# Patient Record
Sex: FEMALE | Race: WHITE | NOT HISPANIC OR LATINO | URBAN - METROPOLITAN AREA
[De-identification: names, ages, dates, MRNs, and addresses within clinical notes are randomized per-mention and may not be internally consistent; named-entity substitution may affect disease eponyms.]

---

## 2017-04-18 ENCOUNTER — IMPORTED ENCOUNTER (OUTPATIENT)
Dept: URBAN - METROPOLITAN AREA CLINIC 50 | Facility: CLINIC | Age: 82
End: 2017-04-18

## 2017-04-26 ENCOUNTER — IMPORTED ENCOUNTER (OUTPATIENT)
Dept: URBAN - METROPOLITAN AREA CLINIC 50 | Facility: CLINIC | Age: 82
End: 2017-04-26

## 2017-10-06 ENCOUNTER — IMPORTED ENCOUNTER (OUTPATIENT)
Dept: URBAN - METROPOLITAN AREA CLINIC 50 | Facility: CLINIC | Age: 82
End: 2017-10-06

## 2017-10-06 NOTE — PATIENT DISCUSSION
"""Follow OS ERM w/o surgery. Call if vision decreases or distortion increases. Recommend regular Amsler checks.  """

## 2017-11-03 ENCOUNTER — IMPORTED ENCOUNTER (OUTPATIENT)
Dept: URBAN - METROPOLITAN AREA CLINIC 50 | Facility: CLINIC | Age: 82
End: 2017-11-03

## 2018-01-10 ENCOUNTER — IMPORTED ENCOUNTER (OUTPATIENT)
Dept: URBAN - METROPOLITAN AREA CLINIC 50 | Facility: CLINIC | Age: 83
End: 2018-01-10

## 2018-01-22 ENCOUNTER — IMPORTED ENCOUNTER (OUTPATIENT)
Dept: URBAN - METROPOLITAN AREA CLINIC 50 | Facility: CLINIC | Age: 83
End: 2018-01-22

## 2018-09-26 ENCOUNTER — IMPORTED ENCOUNTER (OUTPATIENT)
Dept: URBAN - METROPOLITAN AREA CLINIC 50 | Facility: CLINIC | Age: 83
End: 2018-09-26

## 2018-10-18 ENCOUNTER — IMPORTED ENCOUNTER (OUTPATIENT)
Dept: URBAN - METROPOLITAN AREA CLINIC 50 | Facility: CLINIC | Age: 83
End: 2018-10-18

## 2018-10-23 ENCOUNTER — IMPORTED ENCOUNTER (OUTPATIENT)
Dept: URBAN - METROPOLITAN AREA CLINIC 50 | Facility: CLINIC | Age: 83
End: 2018-10-23

## 2019-03-21 NOTE — PATIENT DISCUSSION
Patient given Rx for glasses.  due to work at [employer] needs CPU/NEAR RX ed will have to take on and off in the store.

## 2019-04-23 ENCOUNTER — IMPORTED ENCOUNTER (OUTPATIENT)
Dept: URBAN - METROPOLITAN AREA CLINIC 50 | Facility: CLINIC | Age: 84
End: 2019-04-23

## 2019-10-23 ENCOUNTER — IMPORTED ENCOUNTER (OUTPATIENT)
Dept: URBAN - METROPOLITAN AREA CLINIC 50 | Facility: CLINIC | Age: 84
End: 2019-10-23

## 2019-10-23 NOTE — PATIENT DISCUSSION
"""Continue Artificial tears both eyes two - four times a day ."" ""Continue Gel drops both eyes at bedtime ."""

## 2019-10-25 ENCOUNTER — IMPORTED ENCOUNTER (OUTPATIENT)
Dept: URBAN - METROPOLITAN AREA CLINIC 50 | Facility: CLINIC | Age: 84
End: 2019-10-25

## 2019-10-25 NOTE — PATIENT DISCUSSION
"""Patient elects to change glasses.  Patient instructed to call office immediately if sudden changes ""

## 2020-04-22 ENCOUNTER — IMPORTED ENCOUNTER (OUTPATIENT)
Dept: URBAN - METROPOLITAN AREA CLINIC 50 | Facility: CLINIC | Age: 85
End: 2020-04-22

## 2020-07-21 NOTE — PATIENT DISCUSSION
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 baseline RNFL OCT today.  follow with serial analysis.  recommend get baseline HVF in near future.

## 2020-09-23 NOTE — PATIENT DISCUSSION
9/23/20:.  updated HVF today is full today but watch due to [facility] OD.  ed importance of yearly monitoring to prevent VA loss/blindness.  CCTs are average so true IOP likely as taken.

## 2021-04-08 ENCOUNTER — IMPORTED ENCOUNTER (OUTPATIENT)
Dept: URBAN - METROPOLITAN AREA CLINIC 50 | Facility: CLINIC | Age: 86
End: 2021-04-08

## 2021-04-18 ASSESSMENT — VISUAL ACUITY
OD_OTHER: 20/50. 20/70.
OD_CC: 20/20-
OS_CC: 20/25
OS_CC: 20/40-2
OS_CC: 20/25+2
OD_BAT: 20/100
OD_CC: 20/20
OS_CC: 20/30+2
OD_OTHER: 20/100.
OS_CC: J1+
OD_CC: 20/25+2
OS_CC: 20/25
OS_CC: J1@ 14 IN
OS_OTHER: 20/200.
OS_CC: 20/50
OD_CC: J1+
OD_CC: J1+@ 18 IN
OD_CC: 20/20-1
OD_CC: J1+
OD_CC: 20/25
OD_CC: 20/20-1
OS_CC: 20/25-
OD_CC: 20/30+2
OS_CC: J1+
OD_CC: 20/30+1
OS_CC: 20/40
OS_CC: 20/25-1
OS_CC: J2@ 16 IN
OS_CC: J1+@ 18 IN
OD_BAT: 20/50
OS_CC: 20/25-1
OS_CC: J1+
OS_BAT: 20/70
OD_OTHER: 20/50. 20/70.
OS_OTHER: 20/70. 20/400.
OS_PH: 20/40-1
OD_CC: J1+
OD_CC: J2@ 16 IN
OS_BAT: 20/70
OD_CC: 20/25-1
OD_BAT: 20/50
OS_OTHER: 20/70. 20/400.
OD_CC: 20/25
OD_CC: J1@ 14 IN
OS_BAT: 20/200

## 2021-04-18 ASSESSMENT — TONOMETRY
OD_IOP_MMHG: 14
OD_IOP_MMHG: 13
OD_IOP_MMHG: 16
OS_IOP_MMHG: 15
OS_IOP_MMHG: 12
OD_IOP_MMHG: 16
OD_IOP_MMHG: 14
OS_IOP_MMHG: 12
OS_IOP_MMHG: 12
OS_IOP_MMHG: 15
OD_IOP_MMHG: 13
OD_IOP_MMHG: 13
OS_IOP_MMHG: 17
OD_IOP_MMHG: 14
OS_IOP_MMHG: 14
OS_IOP_MMHG: 17
OD_IOP_MMHG: 17
OD_IOP_MMHG: 15

## 2021-12-21 NOTE — PATIENT DISCUSSION
9/23/20:.  updated HVF today is full today but watch due to [facility]re[facility] 74 OD.  ed importance of yearly monitoring to prevent VA loss/blindness.  CCTs are average so true IOP likely as taken.

## 2021-12-21 NOTE — PATIENT DISCUSSION
Patient understands condition, prognosis and need for follow up care. Osman will contact Dr. Graf and discuss